# Patient Record
Sex: MALE | Race: ASIAN | NOT HISPANIC OR LATINO | URBAN - METROPOLITAN AREA
[De-identification: names, ages, dates, MRNs, and addresses within clinical notes are randomized per-mention and may not be internally consistent; named-entity substitution may affect disease eponyms.]

---

## 2019-01-23 ENCOUNTER — EMERGENCY (EMERGENCY)
Facility: HOSPITAL | Age: 29
LOS: 1 days | Discharge: HOME | End: 2019-01-23
Attending: EMERGENCY MEDICINE | Admitting: EMERGENCY MEDICINE
Payer: COMMERCIAL

## 2019-01-23 VITALS
DIASTOLIC BLOOD PRESSURE: 71 MMHG | HEART RATE: 65 BPM | SYSTOLIC BLOOD PRESSURE: 126 MMHG | OXYGEN SATURATION: 99 % | TEMPERATURE: 98 F | RESPIRATION RATE: 16 BRPM

## 2019-01-23 PROCEDURE — 99283 EMERGENCY DEPT VISIT LOW MDM: CPT

## 2019-01-23 RX ORDER — IBUPROFEN 200 MG
800 TABLET ORAL ONCE
Qty: 0 | Refills: 0 | Status: COMPLETED | OUTPATIENT
Start: 2019-01-23 | End: 2019-01-23

## 2019-01-23 RX ADMIN — Medication 800 MILLIGRAM(S): at 10:30

## 2019-01-23 NOTE — ED PROVIDER NOTE - ENMT, MLM
Airway patent, Nasal mucosa clear. Mouth with normal mucosa. Throat has no vesicles, no oropharyngeal exudates and uvula is midline.  No temporal area TTP.  B/L EACs and TMs normal.

## 2019-01-23 NOTE — ED PROVIDER NOTE - CARE PLAN
Principal Discharge DX:	Nonintractable headache, unspecified chronicity pattern, unspecified headache type  Assessment and plan of treatment:	Patient advised to follow up with Primary care doctor 1-2 days and told to return to the emergency department immediately for any new or concerning symptoms such as nausea, vomiting, dizziness, changes in vision, numbness or tingling, weakness  OR ANY OTHER COMPLAINTS. Patient agrees with plan.    -Take motrin or tylenol as needed for pain, rest, stay hydrated   RETURN IF ANY NEW OR WORSENING SYMPTOMS.  Secondary Diagnosis:	Muscle cramps

## 2019-01-23 NOTE — ED PROVIDER NOTE - MEDICAL DECISION MAKING DETAILS
29 y/o M with intermittent left frontal headache x 3 days.  Also noted intermittent left ant thigh cramping.  PE normal.  Neuro exam intact without deficit.  No evidence SAH/CVA.  No head CT indicated.  Pain med.  Re-eval.

## 2019-01-23 NOTE — ED PROVIDER NOTE - NSFOLLOWUPINSTRUCTIONS_ED_ALL_ED_FT
Patient advised to follow up with Primary care doctor 1-2 days and told to return to the emergency department immediately for any new or concerning symptoms such as nausea, vomiting, dizziness, changes in vision, numbness or tingling, weakness  OR ANY OTHER COMPLAINTS. Patient agrees with plan.    -Take motrin or tylenol as needed for pain, rest, stay hydrated   RETURN IF ANY NEW OR WORSENING SYMPTOMS.

## 2019-01-23 NOTE — ED PROVIDER NOTE - MUSCULOSKELETAL, MLM
Spine appears normal, range of motion is not limited, no muscle or joint tenderness.  Right thigh without deformity or TTP.  No erythema or swelling.

## 2019-01-23 NOTE — ED PROVIDER NOTE - PROGRESS NOTE DETAILS
RAYRAY Coyne- Patient initially seen and worked up by Dr. Kerr, transferred to  pending reassessment s/p analgesic. Patient states sx have improved. States headache resolved, leg cramping improvement however still had another episode while in [place]. Patient denies recent travel ,LE swelling, dysuria, hematuria, recent heavy lifting/trauma, back pain, numbness or tingling or any other complaints. Patient offered lab work to be done, declined states he can follow with PMD this week. Pt stable for dc home and outpatient follow up with PCP 1-2 days. Pt advised to take tylenol as needed for pain and stay hydrated. Pt understood and agreed with plan. All question and concerns addressed. Strict return instructions given. No complaints noted.

## 2019-01-23 NOTE — ED PROVIDER NOTE - OBJECTIVE STATEMENT
29 y/o M c/o left frontal headache intermittent x 3 days.  Usually last few seconds.  Re-occurring multiple times per day.  Also notes crampy sensation on/off in right mid-anterior thigh x 3 days.  Sensation lasts few seconds and resolves.  Also occurring multiple times per day but reports unrelated timing wise with headache pain.  No blurry vision.  Notes he periodically feels pressure sensation in b/l ears x many months.  No slurred speech.  No focal weakness or numbness.  No fever.  No recent trauma.  No abd pain/n/v/d.  PMD [place].  Meds - vit D, trazadone QHS for insomnia.

## 2019-08-19 NOTE — ED PROVIDER NOTE - CARDIOVASCULAR NEGATIVE STATEMENT, MLM
Patient notified of committee decision. States he is already scheduled with a local cardiologist for abnormal stress test.   Also discussed large cyst noted in right kidney. Encouraged patient to follow up with a urologist for monitoring.   All questions were answered and patient verbalized understanding.   no chest pain and no edema.

## 2022-04-14 ENCOUNTER — APPOINTMENT (OUTPATIENT)
Dept: OTOLARYNGOLOGY | Facility: CLINIC | Age: 32
End: 2022-04-14

## 2022-04-18 PROBLEM — Z00.00 ENCOUNTER FOR PREVENTIVE HEALTH EXAMINATION: Status: ACTIVE | Noted: 2022-04-18

## 2022-04-22 ENCOUNTER — APPOINTMENT (OUTPATIENT)
Dept: OTOLARYNGOLOGY | Facility: CLINIC | Age: 32
End: 2022-04-22

## 2022-12-20 ENCOUNTER — EMERGENCY (EMERGENCY)
Facility: HOSPITAL | Age: 32
LOS: 1 days | Discharge: HOME | End: 2022-12-20
Attending: EMERGENCY MEDICINE | Admitting: EMERGENCY MEDICINE

## 2022-12-20 VITALS
SYSTOLIC BLOOD PRESSURE: 113 MMHG | RESPIRATION RATE: 16 BRPM | DIASTOLIC BLOOD PRESSURE: 73 MMHG | HEART RATE: 70 BPM | TEMPERATURE: 99 F | OXYGEN SATURATION: 100 %

## 2022-12-20 VITALS
TEMPERATURE: 99 F | DIASTOLIC BLOOD PRESSURE: 94 MMHG | RESPIRATION RATE: 18 BRPM | OXYGEN SATURATION: 100 % | SYSTOLIC BLOOD PRESSURE: 123 MMHG | HEART RATE: 62 BPM

## 2022-12-20 LAB
APPEARANCE UR: CLEAR — SIGNIFICANT CHANGE UP
BILIRUB UR-MCNC: NEGATIVE — SIGNIFICANT CHANGE UP
COLOR SPEC: SIGNIFICANT CHANGE UP
DIFF PNL FLD: NEGATIVE — SIGNIFICANT CHANGE UP
GLUCOSE UR QL: NEGATIVE — SIGNIFICANT CHANGE UP
KETONES UR-MCNC: NEGATIVE — SIGNIFICANT CHANGE UP
LEUKOCYTE ESTERASE UR-ACNC: NEGATIVE — SIGNIFICANT CHANGE UP
NITRITE UR-MCNC: NEGATIVE — SIGNIFICANT CHANGE UP
PH UR: 6.5 — SIGNIFICANT CHANGE UP (ref 5–8)
PROT UR-MCNC: NEGATIVE — SIGNIFICANT CHANGE UP
SP GR SPEC: 1.02 — SIGNIFICANT CHANGE UP (ref 1.01–1.05)
UROBILINOGEN FLD QL: SIGNIFICANT CHANGE UP

## 2022-12-20 PROCEDURE — 99284 EMERGENCY DEPT VISIT MOD MDM: CPT

## 2022-12-20 PROCEDURE — 76770 US EXAM ABDO BACK WALL COMP: CPT | Mod: 26

## 2022-12-20 PROCEDURE — 71101 X-RAY EXAM UNILAT RIBS/CHEST: CPT | Mod: 26,LT

## 2022-12-20 RX ORDER — LIDOCAINE 4 G/100G
1 CREAM TOPICAL ONCE
Refills: 0 | Status: COMPLETED | OUTPATIENT
Start: 2022-12-20 | End: 2022-12-20

## 2022-12-20 RX ORDER — DIAZEPAM 5 MG
5 TABLET ORAL ONCE
Refills: 0 | Status: DISCONTINUED | OUTPATIENT
Start: 2022-12-20 | End: 2022-12-20

## 2022-12-20 RX ORDER — KETOROLAC TROMETHAMINE 30 MG/ML
15 SYRINGE (ML) INJECTION ONCE
Refills: 0 | Status: DISCONTINUED | OUTPATIENT
Start: 2022-12-20 | End: 2022-12-20

## 2022-12-20 RX ADMIN — Medication 15 MILLIGRAM(S): at 19:37

## 2022-12-20 RX ADMIN — LIDOCAINE 1 PATCH: 4 CREAM TOPICAL at 20:43

## 2022-12-20 RX ADMIN — Medication 5 MILLIGRAM(S): at 21:52

## 2022-12-20 NOTE — ED PROVIDER NOTE - PATIENT PORTAL LINK FT
You can access the FollowMyHealth Patient Portal offered by [facility] by registering at the following website: [email or web address]. By joining [facility]’s FollowMyHealth portal, you will also be able to view your health information using other applications (apps) compatible with our system.

## 2022-12-20 NOTE — ED PROVIDER NOTE - CLINICAL SUMMARY MEDICAL DECISION MAKING FREE TEXT BOX
Patient overall well-appearing concern of pneumothorax and family history as well perform x-ray ultrasound renal as well as urinalysis to assess for gross hematuria.  Patient after assessment of symptoms significant symptomatic improvement and complete evaluation with no symptoms at time of discharge after toradol before before Valium.  X-rays negative ultrasound negative and UA negative hemodynamically stable for DC.

## 2022-12-20 NOTE — ED PROVIDER NOTE - MUSCULOSKELETAL, MLM
Spine appears normal, range of motion is not limited, +left flank muscle ttp, neg  joint tenderness. neg cva

## 2022-12-20 NOTE — ED ADULT TRIAGE NOTE - CHIEF COMPLAINT QUOTE
pt c/o left sided pain beginning yesterday. states when moves a certain way feels sob. no respiratory distress noted. denies any headache, n/v, fever, cough. no PMH pt c/o left sided pain beginning yesterday. states when moves a certain way feels sob. no respiratory distress noted. denies any headache, n/v, fever, cough. denies any trauma or injury. no PMH

## 2022-12-20 NOTE — ED PROVIDER NOTE - OBJECTIVE STATEMENT
32-year-old male presents with acute onset of left-sided flank pain worse with movement.  Patient concerned due to recent diagnosis of brother with pneumothorax.  Patient speaking full sentences complains of mild worsening pain with deep inspiration and worse with rotation of upper thorax.  Patient denies fever nausea vomiting diarrhea or shortness of breath at rest.  Patient denies any centralized chest pain no family history of early cardiac death.  No recent travel no recent traumas no MVAs or falls.  Hemodynamically stable otherwise
